# Patient Record
Sex: FEMALE | Race: BLACK OR AFRICAN AMERICAN | URBAN - METROPOLITAN AREA
[De-identification: names, ages, dates, MRNs, and addresses within clinical notes are randomized per-mention and may not be internally consistent; named-entity substitution may affect disease eponyms.]

---

## 2018-08-24 ENCOUNTER — HOSPITAL ENCOUNTER (OUTPATIENT)
Dept: PHYSICAL THERAPY | Age: 28
Discharge: HOME | End: 2018-08-24
Payer: COMMERCIAL

## 2018-08-24 PROCEDURE — 97035 APP MDLTY 1+ULTRASOUND EA 15: CPT

## 2018-08-24 PROCEDURE — 97110 THERAPEUTIC EXERCISES: CPT

## 2018-08-24 PROCEDURE — 97161 PT EVAL LOW COMPLEX 20 MIN: CPT

## 2018-08-24 NOTE — PROGRESS NOTES
Hermelinda Lyon  : 1990 [facility] at [address], [address], [address]  [phone]   [phone]          OUTPATIENT ORTHOPAEDIC PHYSICAL THERAPY    NAME/AGE/GENDER: Hermelinda Lyon is a 33 y.o. female    DATE: 2018                       Ambulatory/Rehab Services H2 Model Falls Risk Assessment    Risk Factor Pts. ·   Confusion/Disorientation/Impulsivity  []    4 ·   Symptomatic Depression  []   2 ·   Altered Elimination  []   1 ·   Dizziness/Vertigo  []   1 ·   Gender (Male)  []   1 ·   Any administered antiepileptics (anticonvulsants):  []   2 ·   Any administered benzodiazepines:  []   1 ·   Visual Impairment (specify):  []   1 ·   Portable Oxygen Use  []   1 ·   Orthostatic ? BP  []   1 ·   History of Recent Falls (within 3 mos.)  [x]   5     Ability to Rise from Chair (choose one) Pts. ·   Ability to rise in a single movement  [x]   0 ·   Pushes up, successful in one attempt  []   1 ·   Multiple attempts, but unsuccessful  []   3 ·   Unable to rise without assistance  []   4   Total: (5 or greater = High Risk) 6     Falls Prevention Plan:   []                Physical Limitations to Exercise (specify):   []                Mobility Assistance Device (type):   []                Exercise/Equipment Adaptation (specify):    © [facility] [address] [place] [address] Patent #[id number].  Federal Law prohibits the replication, distribution or use without written permission from [facility] of [address] [place] [address]

## 2018-08-24 NOTE — THERAPY EVALUATION
Kenzie Farmer  : 1990  Primary: [facility]  Secondary:  [address]  at [place] [id number] [facility], [address] [place] [address],[address], [place] [address].  [phone]   [phone]          OUTPATIENT PHYSICAL THERAPY:Initial Assessment 2018   ICD-10: Treatment Diagnosis:   · Pain in left knee (M25.562)  · Stiffness of left knee, not elsewhere classified (C17.498)  Precautions/Allergies:   Review of patient's allergies indicates no known allergies. Fall Risk Score: 6 (? 5 = High Risk)  MD Orders: Evaluate and Treat MEDICAL/REFERRING DIAGNOSIS:  Other chronic pain [G89.29]  Pain in left knee [M25.562]   DATE OF ONSET: Chronic  REFERRING PHYSICIAN: Dick Syed: JEET     INITIAL ASSESSMENT:  Ms. Laina Cuadra presents with decreased mobility and pain secondary to degenerative changes. After discussing with patient, she agreed she would benefit from physical therapy to improve above deficits. Please sign this plan of treatment if you concur. Thank you for the opportunity to serve this patient. PROBLEM LIST (Impacting functional limitations):  1. Decreased Strength  2. Decreased ADL/Functional Activities  3. Increased Pain  4. Decreased Activity Tolerance INTERVENTIONS PLANNED:  1. Balance Exercise  2. Home Exercise Program (HEP)  3. Neuromuscular Re-education/Strengthening  4. Range of Motion (ROM)  5. Therapeutic Activites  6. Therapeutic Exercise/Strengthening   TREATMENT PLAN:  Effective Dates: 2018 TO 2018 (90 days). Frequency/Duration: 2 times a week for 90 Days  GOALS: (Goals have been discussed and agreed upon with patient.)  Short-Term Functional Goals: Time Frame: 30 days  1. Patient will be independent with home exercise program without exacerbation of symptoms or cueing needed.    2. Patient will be independent with correct left LE positioning and awareness/avoidance of aggravating positions without cueing needed. Discharge Goals: Time Frame: 90 days  1. Patient will be independent with all ADLs with minimal onset of left knee pain and no deficits with daily tasks. 2. Patient will report no fear avoidance with social or recreational activities due to left knee pain. 3. Patient will score greater than or equal to 70/80 on Lower Extremity Functional Scale with minimal effect of left knee pain on patient's ability to manage every day life activities. Rehabilitation Potential For Stated Goals: Good  Regarding [address] [place] [address] therapy, I certify that the treatment plan above will be carried out by a therapist or under their direction. Thank you for this referral,  Norman Barrientos, PT     Referring Physician Signature: Carola Beatty*              Date                    The information in this section was collected on 8/24/2018 (except where otherwise noted). HISTORY:   History of Present Injury/Illness (Reason for Referral):  Patient reports for several months she has been having left knee pain. She reports that it \"pops out and is painful\" until she is able to \"pop it back into place. \"  She reports that she has fallen several times because of the pain. She reports that she works nights now at [employer] and so does more sitting, but she is still having pain. She reports she would like to improve her overall range of motion and decrease her pain with all activities so she doesn't fall again. Past Medical History/Comorbidities:   Ms. Octavio Lewis  has no past medical history of Aneurysm ([address] Arrhythmia; Arthritis; Asthma; Autoimmune disease ([address] CAD (coronary artery disease); Cancer ([address] Chronic kidney disease; Chronic pain; Coagulation defects; COPD; Diabetes ([address] GERD (gastroesophageal reflux disease); Heart failure ([address] Hypertension; Liver disease; Psychiatric disorder; PUD (peptic ulcer disease); Seizures ([address] Stroke [facility]); Thromboembolus ([address] or Thyroid disease.   Ms. Emili Collins  has a past surgical history that includes delivery . Social History/Living Environment:   Home Environment: Private residence  Living Alone: No  Support Systems: Family member(s), Friends \ neighbors  Prior Level of Function/Work/Activity:  Independent  Dominant Side:         RIGHT  Personal Factors:          Sex:  female        Age:  33 y.o. Current Medications:     None   Date Last Reviewed:  2018    Number of Personal Factors/Comorbidities that affect the Plan of Care: 0: LOW COMPLEXITY   EXAMINATION:   Observation/Orthostatic Postural Assessment:          Posture Assessment: Rounded shoulders, Forward head   Palpation:          Tightness and tenderness to palpation noted along lateral portion of left knee. Decreased patellar mobility. No bruising, minimal swelling noted. ROM:          L LE Assessment (AROM):   · Knee Flexion: 120 degrees    · Knee Extension: 0 degrees     Strength:          L LE Assessment (Strength):   · Knee Flexion: 3+/5 with manual muscle testing    · Knee Extension: 3+/5 with manual muscle testing     Special Tests:          Negative anterior drawer test for ACL involvement. Negative (weak) Medial-Lateral Grind test for possible meniscal involvement in left lower extremity. Negative anurag test with rotation for ACL, MCL or capsular involvement. Negative Posterior Drawer test for PCL involvement. Negative posteromedial drawer test for PCL, MCL or capsular involvement. Negative posterolateral drawer test for PCL, LCL or capsular involvement. Neurological Screen:        Dermatomes:   Within normal limits        Reflexes:  2+  Functional Mobility:         Gait/Ambulation:     · Base of Support: Narrowed  · Speed/Charlene: Pace decreased (<100 feet/min)  · Step Length: Left shortened, Right lengthened  · Swing Pattern: Left asymmetrical, Right symmetrical  · Stance: Left decreased, Right increased  · Gait Abnormalities: Antalgic  · Ambulation - Level of Assistance: Independent  · Interventions: Verbal cues, Safety awareness training          Transfers:  Independent        Bed Mobility:  Independent   Body Structures Involved:  1. Joints  2. Muscles Body Functions Affected:  1. Neuromusculoskeletal  2. Movement Related Activities and Participation Affected:  1. Mobility  2. Self Care   Number of elements (examined above) that affect the Plan of Care: 4+: HIGH COMPLEXITY   CLINICAL PRESENTATION:   Presentation: Stable and uncomplicated: LOW COMPLEXITY   CLINICAL DECISION MAKING:   Outcome Measure: Tool Used: Lower Extremity Functional Scale (LEFS)  Score:  Initial: 59/80 Most Recent: X/80 (Date: -- )   Interpretation of Score: 20 questions each scored on a 5 point scale with 0 representing \"extreme difficulty or unable to perform\" and 4 representing \"no difficulty\". The lower the score, the greater the functional disability. 80/80 represents no disability. Minimal detectable change is 9 points. Score 80 79-63 62-48 47-32 31-16 15-1 0   Modifier CH CI CJ CK CL CM CN     Medical Necessity:   · Patient is expected to demonstrate progress in strength and range of motion to increase independence with daily activities. · Patient demonstrates good rehab potential due to higher previous functional level. · Skilled intervention continues to be required due to decreased mobility. Reason for Services/Other Comments:  · Patient continues to demonstrate capacity to improve overall functional mobility which will increase independence and increase safety. Use of outcome tool(s) and clinical judgement create a POC that gives a: Clear prediction of patient's progress: LOW COMPLEXITY            TREATMENT:   (In addition to Assessment/Re-Assessment sessions the following treatments were rendered)  Pre-treatment Symptoms/Complaints:  8/24/2018: Patient reports she isn't hurting right now, but has had pain this morning.    Pain: Initial:   Pain Intensity 1: 5  Pain Location 1: Knee  Pain Orientation 1: Left  Pain Intervention(s) 1: Rest, Medication (see MAR)  Post Session:  5/10     THERAPEUTIC EXERCISE: (10 minutes):  Exercises per grid below to improve mobility, strength, balance and coordination. Required minimal visual, verbal and manual cues to promote proper body alignment, promote proper body posture, promote proper body mechanics and promote proper body breathing techniques. Progressed resistance, range, repetitions and complexity of movement as indicated. Date:  8/24/2018   Activity/Exercise Parameters   Self patellar mobilizations in all directions HEP   Supine straight leg raise: hip flexion HEP   Side-lying hip abduction HEP   Seated LAQ HEP      MODALITIES: (8 minutes): *  Ultrasound was used today secondary to the patient having tightened structures limiting joint motion that require an increase in extensibility and symptomatic soft tissue calcification. Ultrasound was used today to reduce pain, reduce joint stiffness, increase muscle flexibility, increase tendon flexibility and increase ligament flexibility. (Patient in supine - left knee - skin clear after treatment.)   [facility] Portal    Treatment/Session Assessment:    · Response to Treatment:  Patient tolerated assessment without complaints of increased pain. Patient verbalized and demonstrated understanding of HEP. · Compliance with Program/Exercises: Will assess as treatment progresses. · Recommendations/Intent for next treatment session: \"Next visit will focus on advancements to more challenging activities\".   Total Treatment Duration:  PT Patient Time In/Time Out  Time In: 0900  Time Out: CECELIA Butler

## 2018-08-29 ENCOUNTER — HOSPITAL ENCOUNTER (OUTPATIENT)
Dept: PHYSICAL THERAPY | Age: 28
Discharge: HOME | End: 2018-08-29
Payer: COMMERCIAL

## 2018-08-29 PROCEDURE — 97110 THERAPEUTIC EXERCISES: CPT

## 2018-08-29 PROCEDURE — 97035 APP MDLTY 1+ULTRASOUND EA 15: CPT

## 2018-08-31 ENCOUNTER — HOSPITAL ENCOUNTER (OUTPATIENT)
Dept: PHYSICAL THERAPY | Age: 28
Discharge: HOME | End: 2018-08-31
Payer: COMMERCIAL

## 2018-08-31 PROCEDURE — 97110 THERAPEUTIC EXERCISES: CPT

## 2018-08-31 NOTE — PROGRESS NOTES
Cesilia Sequeira  : 1990  Primary: [facility]  Secondary:  [address]  at [facility]  [address] [facility] [address], [address] [facility]  [phone]   [phone]          OUTPATIENT PHYSICAL THERAPY:Daily Note 2018   ICD-10: Treatment Diagnosis:   · Pain in left knee (M25.562)  · Stiffness of left knee, not elsewhere classified (H17.180)  Precautions/Allergies:   Review of patient's allergies indicates no known allergies. Fall Risk Score: 6 (? 5 = High Risk)  MD Orders: Evaluate and Treat MEDICAL/REFERRING DIAGNOSIS:  Other chronic pain [G89.29]  Pain in left knee [M25.562]   DATE OF ONSET: Chronic  REFERRING PHYSICIAN: Emma Penaloza*  RETURN PHYSICIAN APPOINTMENT: TBD     ASSESSMENT:  Ms. Julia Lanier presents with decreased mobility and pain secondary to degenerative changes. Patient has attended a total of 3 scheduled physical therapy visits including initial evaluation on 2018. Treatment has consisted of strengthening, stretching, and modalities to improve pain and performance with activities of daily living. PROBLEM LIST (Impacting functional limitations):  1. Decreased Strength  2. Decreased ADL/Functional Activities  3. Increased Pain  4. Decreased Activity Tolerance INTERVENTIONS PLANNED:  1. Balance Exercise  2. Home Exercise Program (HEP)  3. Neuromuscular Re-education/Strengthening  4. Range of Motion (ROM)  5. Therapeutic Activites  6. Therapeutic Exercise/Strengthening   TREATMENT PLAN:  Effective Dates: 2018 TO 2018 (90 days). Frequency/Duration: 2 times a week for 90 Days  GOALS: (Goals have been discussed and agreed upon with patient.)  Short-Term Functional Goals: Time Frame: 30 days  1. Patient will be independent with home exercise program without exacerbation of symptoms or cueing needed.    2. Patient will be independent with correct left LE positioning and awareness/avoidance of aggravating positions without cueing needed. Discharge Goals: Time Frame: 90 days  1. Patient will be independent with all ADLs with minimal onset of left knee pain and no deficits with daily tasks. 2. Patient will report no fear avoidance with social or recreational activities due to left knee pain. 3. Patient will score greater than or equal to 70/80 on Lower Extremity Functional Scale with minimal effect of left knee pain on patient's ability to manage every day life activities. Rehabilitation Potential For Stated Goals: Good            The information in this section was collected on 2018 (except where otherwise noted). HISTORY:   History of Present Injury/Illness (Reason for Referral):  Patient reports for several months she has been having left knee pain. She reports that it \"pops out and is painful\" until she is able to \"pop it back into place. \"  She reports that she has fallen several times because of the pain. She reports that she works nights now at [employer] and so does more sitting, but she is still having pain. She reports she would like to improve her overall range of motion and decrease her pain with all activities so she doesn't fall again. Past Medical History/Comorbidities:   Ms. Octavio Lewis  has no past medical history of Aneurysm ([address] Arrhythmia; Arthritis; Asthma; Autoimmune disease ([address] CAD (coronary artery disease); Cancer ([address] Chronic kidney disease; Chronic pain; Coagulation defects; COPD; Diabetes ([address] GERD (gastroesophageal reflux disease); Heart failure ([address] Hypertension; Liver disease; Psychiatric disorder; PUD (peptic ulcer disease); Seizures ([facility] Stroke [facility]); Thromboembolus ([facility] or Thyroid disease. Ms. Octavio Lewis  has a past surgical history that includes delivery .   Social History/Living Environment:   Home Environment: Private residence  Living Alone: No  Support Systems: Family member(s), Friends \ neighbors  Prior Level of Function/Work/Activity:  Independent  Dominant Side:         RIGHT  Personal Factors:          Sex:  female        Age:  33 y.o. Current Medications:     None   Date Last Reviewed:  8/31/2018    Number of Personal Factors/Comorbidities that affect the Plan of Care: 0: LOW COMPLEXITY   EXAMINATION:   Observation/Orthostatic Postural Assessment:          Posture Assessment: Rounded shoulders, Forward head   Palpation:          Tightness and tenderness to palpation noted along lateral portion of left knee. Decreased patellar mobility. No bruising, minimal swelling noted. ROM:          L LE Assessment (AROM):   · Knee Flexion: 120 degrees    · Knee Extension: 0 degrees     Strength:          L LE Assessment (Strength):   · Knee Flexion: 3+/5 with manual muscle testing    · Knee Extension: 3+/5 with manual muscle testing     Special Tests:          Negative anterior drawer test for ACL involvement. Negative (weak) Medial-Lateral Grind test for possible meniscal involvement in left lower extremity. Negative anurag test with rotation for ACL, MCL or capsular involvement. Negative Posterior Drawer test for PCL involvement. Negative posteromedial drawer test for PCL, MCL or capsular involvement. Negative posterolateral drawer test for PCL, LCL or capsular involvement. Neurological Screen:        Dermatomes: Within normal limits        Reflexes:  2+  Functional Mobility:         Gait/Ambulation:     · Base of Support: Narrowed  · Speed/Charlene: Pace decreased (<100 feet/min)  · Step Length: Left shortened, Right lengthened  · Swing Pattern: Left asymmetrical, Right symmetrical  · Stance: Left decreased, Right increased  · Gait Abnormalities: Antalgic  · Ambulation - Level of Assistance: Independent  · Interventions: Verbal cues, Safety awareness training          Transfers:  Independent        Bed Mobility:  Independent   Body Structures Involved:  1. Joints  2.  Muscles Body Functions Affected:  1. Neuromusculoskeletal  2. Movement Related Activities and Participation Affected:  1. Mobility  2. Self Care   Number of elements (examined above) that affect the Plan of Care: 4+: HIGH COMPLEXITY   CLINICAL PRESENTATION:   Presentation: Stable and uncomplicated: LOW COMPLEXITY   CLINICAL DECISION MAKING:   Outcome Measure: Tool Used: Lower Extremity Functional Scale (LEFS)  Score:  Initial: 59/80 Most Recent: X/80 (Date: -- )   Interpretation of Score: 20 questions each scored on a 5 point scale with 0 representing \"extreme difficulty or unable to perform\" and 4 representing \"no difficulty\". The lower the score, the greater the functional disability. 80/80 represents no disability. Minimal detectable change is 9 points. Score 80 79-63 62-48 47-32 31-16 15-1 0   Modifier CH CI CJ CK CL CM CN     Medical Necessity:   · Patient is expected to demonstrate progress in strength and range of motion to increase independence with daily activities. · Patient demonstrates good rehab potential due to higher previous functional level. · Skilled intervention continues to be required due to decreased mobility. Reason for Services/Other Comments:  · Patient continues to demonstrate capacity to improve overall functional mobility which will increase independence and increase safety. Use of outcome tool(s) and clinical judgement create a POC that gives a: Clear prediction of patient's progress: LOW COMPLEXITY            TREATMENT:   (In addition to Assessment/Re-Assessment sessions the following treatments were rendered)  Pre-treatment Symptoms/Complaints:  8/31/2018: Patient reports her knee has not popped in/out since her last session.   Pain: Initial:   Pain Intensity 1: 5  Pain Location 1: Knee  Pain Orientation 1: Left  Pain Intervention(s) 1: Rest, Medication (see MAR)  Post Session:  5/10     THERAPEUTIC EXERCISE: (45 minutes):  Exercises per grid below to improve mobility, strength, balance and coordination. Required minimal visual, verbal and manual cues to promote proper body alignment, promote proper body posture, promote proper body mechanics and promote proper body breathing techniques. Progressed resistance, range, repetitions and complexity of movement as indicated. Date:  8/31/2018   Activity/Exercise Parameters   Becky 7 minutes   Tiltboard: right/left, anterior/posterior Static: 20 seconds  Dynamic: 20 reps   Step taps 6 inch  20 reps  B LE   Step ups 6 inch  15 reps  B LE   Calf stretch on slantboard 5 second hold  10 reps   SAQ 15 reps  B LE   Supine straight leg raise: hip flexion 15 reps  L LE   Side-lying hip abduction 15 reps  L LE   Prone hip extension 15 reps  L LE   Kinesiotape L knee - for stability      MODALITIES: (0 minutes): *  Ultrasound was used today secondary to the patient having tightened structures limiting joint motion that require an increase in extensibility and symptomatic soft tissue calcification. Ultrasound was used today to reduce pain, reduce joint stiffness, increase muscle flexibility, increase tendon flexibility and increase ligament flexibility. (Patient in supine - left knee - skin clear after treatment.)   [facility] Portal    Treatment/Session Assessment:    · Response to Treatment:  Patient tolerated treatment without complaints of increased pain. · Compliance with Program/Exercises: Will assess as treatment progresses. · Recommendations/Intent for next treatment session: \"Next visit will focus on advancements to more challenging activities\".   Total Treatment Duration:  PT Patient Time In/Time Out  Time In: 1430  Time Out: [address] [place], CECELIA

## 2018-09-05 ENCOUNTER — HOSPITAL ENCOUNTER (OUTPATIENT)
Dept: PHYSICAL THERAPY | Age: 28
Discharge: HOME | End: 2018-09-05
Payer: COMMERCIAL

## 2018-09-05 NOTE — PROGRESS NOTES
Antonio Pantoja : 1990 Primary: [address] [facility] Secondary:  [facility] Center at [place] [id number] [facility], [address], [address] [facility] [phone]   [phone] OUTPATIENT PHYSICAL THERAPY:Daily Note 2018 ICD-10: Treatment Diagnosis:  
· Pain in left knee (M25.562) · Stiffness of left knee, not elsewhere classified (M25.662) Precautions/Allergies:  
Review of patient's allergies indicates no known allergies. Fall Risk Score: 6 (? 5 = High Risk) MD Orders: Evaluate and Treat MEDICAL/REFERRING DIAGNOSIS: 
Other chronic pain [G89.29] Pain in left knee [M25.562] DATE OF ONSET: Chronic REFERRING PHYSICIAN: Munir MIRANDA PHYSICIAN APPOINTMENT: TBD ASSESSMENT:  Ms. Fredis Carnes presents with decreased mobility and pain secondary to degenerative changes. Patient has attended a total of 4 scheduled physical therapy visits including initial evaluation on 2018. Treatment has consisted of strengthening, stretching, and modalities to improve pain and performance with activities of daily living. PROBLEM LIST (Impacting functional limitations): 1. Decreased Strength 2. Decreased ADL/Functional Activities 3. Increased Pain 4. Decreased Activity Tolerance INTERVENTIONS PLANNED: 
1. Balance Exercise 2. Home Exercise Program (HEP) 3. Neuromuscular Re-education/Strengthening 4. Range of Motion (ROM) 5. Therapeutic Activites 6. Therapeutic Exercise/Strengthening TREATMENT PLAN: 
Effective Dates: 2018 TO 2018 (90 days). Frequency/Duration: 2 times a week for 90 Days GOALS: (Goals have been discussed and agreed upon with patient.) Short-Term Functional Goals: Time Frame: 30 days 1. Patient will be independent with home exercise program without exacerbation of symptoms or cueing needed.   
2. Patient will be independent with correct left LE positioning and awareness/avoidance of aggravating positions without cueing needed. Discharge Goals: Time Frame: 90 days 1. Patient will be independent with all ADLs with minimal onset of left knee pain and no deficits with daily tasks. 2. Patient will report no fear avoidance with social or recreational activities due to left knee pain. 3. Patient will score greater than or equal to 70/80 on Lower Extremity Functional Scale with minimal effect of left knee pain on patient's ability to manage every day life activities. Rehabilitation Potential For Stated Goals: Good The information in this section was collected on 2018 (except where otherwise noted). HISTORY:  
History of Present Injury/Illness (Reason for Referral): 
Patient reports for several months she has been having left knee pain. She reports that it \"pops out and is painful\" until she is able to \"pop it back into place. \"  She reports that she has fallen several times because of the pain. She reports that she works nights now at [employer] and so does more sitting, but she is still having pain. She reports she would like to improve her overall range of motion and decrease her pain with all activities so she doesn't fall again. Past Medical History/Comorbidities: Ms. Hudson Barba  has no past medical history of Aneurysm ([address] Arrhythmia; Arthritis; Asthma; Autoimmune disease ([address] CAD (coronary artery disease); Cancer ([address] Chronic kidney disease; Chronic pain; Coagulation defects; COPD; Diabetes ([address] GERD (gastroesophageal reflux disease); Heart failure ([address] Hypertension; Liver disease; Psychiatric disorder; PUD (peptic ulcer disease); Seizures ([address] Stroke [facility]); Thromboembolus ([address] or Thyroid disease. Ms. Hudson Barba  has a past surgical history that includes delivery . Social History/Living Environment:  
Home Environment: Private residence Living Alone: No 
Support Systems: Family member(s), Friends \ neighbors Prior Level of Function/Work/Activity: 
Independent Dominant Side:  
      RIGHT Personal Factors:   
      Sex:  female Age:  33 y.o. Current Medications:   
 None Date Last Reviewed:  9/5/2018 Number of Personal Factors/Comorbidities that affect the Plan of Care: 0: LOW COMPLEXITY EXAMINATION:  
Observation/Orthostatic Postural Assessment:   
      Posture Assessment: Rounded shoulders, Forward head Palpation:   
      Tightness and tenderness to palpation noted along lateral portion of left knee. Decreased patellar mobility. No bruising, minimal swelling noted. ROM:   
      L LE Assessment (AROM): · Knee Flexion: 120 degrees · Knee Extension: 0 degrees Strength:   
      L LE Assessment (Strength): · Knee Flexion: 3+/5 with manual muscle testing · Knee Extension: 3+/5 with manual muscle testing Special Tests:   
      Negative anterior drawer test for ACL involvement. Negative (weak) Medial-Lateral Grind test for possible meniscal involvement in left lower extremity. Negative anurag test with rotation for ACL, MCL or capsular involvement. Negative Posterior Drawer test for PCL involvement. Negative posteromedial drawer test for PCL, MCL or capsular involvement. Negative posterolateral drawer test for PCL, LCL or capsular involvement. Neurological Screen: 
      Dermatomes: Within normal limits Reflexes:  2+ Functional Mobility:  
      Gait/Ambulation: · Base of Support: Narrowed · Speed/Charlene: Pace decreased (<100 feet/min) · Step Length: Left shortened, Right lengthened · Swing Pattern: Left asymmetrical, Right symmetrical 
· Stance: Left decreased, Right increased · Gait Abnormalities: Antalgic · Ambulation - Level of Assistance: Independent · Interventions: Verbal cues, Safety awareness training Transfers:  Independent Bed Mobility:  Independent Body Structures Involved: 1. Joints 2. Muscles Body Functions Affected: 1. Neuromusculoskeletal 
2. Movement Related Activities and Participation Affected: 1. Mobility 2. Self Care Number of elements (examined above) that affect the Plan of Care: 4+: HIGH COMPLEXITY CLINICAL PRESENTATION:  
Presentation: Stable and uncomplicated: LOW COMPLEXITY CLINICAL DECISION MAKING:  
Outcome Measure: Tool Used: Lower Extremity Functional Scale (LEFS) Score:  Initial: 59/80 Most Recent: X/80 (Date: -- ) Interpretation of Score: 20 questions each scored on a 5 point scale with 0 representing \"extreme difficulty or unable to perform\" and 4 representing \"no difficulty\". The lower the score, the greater the functional disability. 80/80 represents no disability. Minimal detectable change is 9 points. Score 80 79-63 62-48 47-32 31-16 15-1 0 Modifier CH CI CJ CK CL CM CN Medical Necessity:  
· Patient is expected to demonstrate progress in strength and range of motion to increase independence with daily activities. · Patient demonstrates good rehab potential due to higher previous functional level. · Skilled intervention continues to be required due to decreased mobility. Reason for Services/Other Comments: 
· Patient continues to demonstrate capacity to improve overall functional mobility which will increase independence and increase safety. Use of outcome tool(s) and clinical judgement create a POC that gives a: Clear prediction of patient's progress: LOW COMPLEXITY  
  
 
 
 
TREATMENT:  
(In addition to Assessment/Re-Assessment sessions the following treatments were rendered) Pre-treatment Symptoms/Complaints:  9/5/2018: Patient reports her knee has not popped in/out since her last session. Pain: Initial:  
   Post Session:  5/10 THERAPEUTIC EXERCISE: (45 minutes):  Exercises per grid below to improve mobility, strength, balance and coordination.   Required minimal visual, verbal and manual cues to promote proper body alignment, promote proper body posture, promote proper body mechanics and promote proper body breathing techniques. Progressed resistance, range, repetitions and complexity of movement as indicated. Date: 
9-5-18 Activity/Exercise Parameters Airdyne 7 minutes Tiltboard: right/left, anterior/posterior Static: 20 seconds Dynamic: 20 reps Step taps 6 inch 20 reps B LE  
Step ups 6 inch 15 reps B LE  
Calf stretch on slantboard 5 second hold 10 reps SAQ-LAQ 15 reps B LE  
Supine straight leg raise: hip flexion 15 reps L LE Side-lying hip abduction 15 reps L LE  
Prone hip extension Kinesiotape of knee MODALITIES: (0 minutes): *  Ultrasound was used today secondary to the patient having tightened structures limiting joint motion that require an increase in extensibility and symptomatic soft tissue calcification. Ultrasound was used today to reduce pain, reduce joint stiffness, increase muscle flexibility, increase tendon flexibility and increase ligament flexibility. (Patient in supine - left knee - skin clear after treatment.) [facility] 
 
Treatment/Session Assessment:   
· Response to Treatment:  Patient tolerated treatment without complaints of increased pain. · Compliance with Program/Exercises: Will assess as treatment progresses. · Recommendations/Intent for next treatment session: \"Next visit will focus on advancements to more challenging activities\".  
Total Treatment Duration: 
  
 
Zayda Plaza PTA

## 2018-09-07 ENCOUNTER — HOSPITAL ENCOUNTER (OUTPATIENT)
Dept: PHYSICAL THERAPY | Age: 28
Discharge: HOME | End: 2018-09-07
Payer: COMMERCIAL

## 2018-09-07 PROCEDURE — 97110 THERAPEUTIC EXERCISES: CPT

## 2018-09-07 NOTE — PROGRESS NOTES
Joel Mills  : 1990  Primary: [facility]  Secondary:  [address] at [place] [address] [facility], [address] [place] [address],[address], [address] [employer] [facility]  [phone]   [phone]          OUTPATIENT PHYSICAL THERAPY:Daily Note 2018   ICD-10: Treatment Diagnosis:   · Pain in left knee (M25.562)  · Stiffness of left knee, not elsewhere classified (T47.779)  Precautions/Allergies:   Review of patient's allergies indicates no known allergies. Fall Risk Score: 6 (? 5 = High Risk)  MD Orders: Evaluate and Treat MEDICAL/REFERRING DIAGNOSIS:  Other chronic pain [G89.29]  Pain in left knee [M25.562]   DATE OF ONSET: Chronic  REFERRING PHYSICIAN: Anthony Miller*  RETURN PHYSICIAN APPOINTMENT: TBD     ASSESSMENT:  Ms. Leighton Howard presents with decreased mobility and pain secondary to degenerative changes. Patient has attended a total of 4 scheduled physical therapy visits including initial evaluation on 2018. Treatment has consisted of strengthening, stretching, and modalities to improve pain and performance with activities of daily living. PROBLEM LIST (Impacting functional limitations):  1. Decreased Strength  2. Decreased ADL/Functional Activities  3. Increased Pain  4. Decreased Activity Tolerance INTERVENTIONS PLANNED:  1. Balance Exercise  2. Home Exercise Program (HEP)  3. Neuromuscular Re-education/Strengthening  4. Range of Motion (ROM)  5. Therapeutic Activites  6. Therapeutic Exercise/Strengthening   TREATMENT PLAN:  Effective Dates: 2018 TO 2018 (90 days). Frequency/Duration: 2 times a week for 90 Days  GOALS: (Goals have been discussed and agreed upon with patient.)  Short-Term Functional Goals: Time Frame: 30 days  1. Patient will be independent with home exercise program without exacerbation of symptoms or cueing needed.    2. Patient will be independent with correct left LE positioning and awareness/avoidance of aggravating positions without cueing needed. Discharge Goals: Time Frame: 90 days  1. Patient will be independent with all ADLs with minimal onset of left knee pain and no deficits with daily tasks. 2. Patient will report no fear avoidance with social or recreational activities due to left knee pain. 3. Patient will score greater than or equal to 70/80 on Lower Extremity Functional Scale with minimal effect of left knee pain on patient's ability to manage every day life activities. Rehabilitation Potential For Stated Goals: Good            The information in this section was collected on 2018 (except where otherwise noted). HISTORY:   History of Present Injury/Illness (Reason for Referral):  Patient reports for several months she has been having left knee pain. She reports that it \"pops out and is painful\" until she is able to \"pop it back into place. \"  She reports that she has fallen several times because of the pain. She reports that she works nights now at [address] [place] [address] [employer] and so does more sitting, but she is still having pain. She reports she would like to improve her overall range of motion and decrease her pain with all activities so she doesn't fall again. Past Medical History/Comorbidities:   Ms. Niru Gordon  has no past medical history of Aneurysm ([address] Arrhythmia; Arthritis; Asthma; Autoimmune disease ([address] CAD (coronary artery disease); Cancer ([address] Chronic kidney disease; Chronic pain; Coagulation defects; COPD; Diabetes ([address] GERD (gastroesophageal reflux disease); Heart failure ([address] Hypertension; Liver disease; Psychiatric disorder; PUD (peptic ulcer disease); Seizures ([facility] Stroke [facility]); Thromboembolus ([facility] [address] or Thyroid disease. Ms. Niru Gordon  has a past surgical history that includes delivery .   Social History/Living Environment:   Home Environment: Private residence  Living Alone: No  Support Systems: Family member(s), Friends \ neighbors  Prior Level of Function/Work/Activity:  Independent  Dominant Side:         RIGHT  Personal Factors:          Sex:  female        Age:  33 y.o. Current Medications:     None   Date Last Reviewed:  9/7/2018    Number of Personal Factors/Comorbidities that affect the Plan of Care: 0: LOW COMPLEXITY   EXAMINATION:   Observation/Orthostatic Postural Assessment:          Posture Assessment: Rounded shoulders, Forward head   Palpation:          Tightness and tenderness to palpation noted along lateral portion of left knee. Decreased patellar mobility. No bruising, minimal swelling noted. ROM:          L LE Assessment (AROM):   · Knee Flexion: 120 degrees    · Knee Extension: 0 degrees     Strength:          L LE Assessment (Strength):   · Knee Flexion: 3+/5 with manual muscle testing    · Knee Extension: 3+/5 with manual muscle testing     Special Tests:          Negative anterior drawer test for ACL involvement. Negative (weak) Medial-Lateral Grind test for possible meniscal involvement in left lower extremity. Negative anurag test with rotation for ACL, MCL or capsular involvement. Negative Posterior Drawer test for PCL involvement. Negative posteromedial drawer test for PCL, MCL or capsular involvement. Negative posterolateral drawer test for PCL, LCL or capsular involvement. Neurological Screen:        Dermatomes: Within normal limits        Reflexes:  2+  Functional Mobility:         Gait/Ambulation:     · Base of Support: Narrowed  · Speed/Charlene: Pace decreased (<100 feet/min)  · Step Length: Left shortened, Right lengthened  · Swing Pattern: Left asymmetrical, Right symmetrical  · Stance: Left decreased, Right increased  · Gait Abnormalities: Antalgic  · Ambulation - Level of Assistance: Independent  · Interventions: Verbal cues, Safety awareness training          Transfers:  Independent        Bed Mobility:  Independent   Body Structures Involved:  1. Joints  2.  Muscles Body Functions Affected:  1. Neuromusculoskeletal  2. Movement Related Activities and Participation Affected:  1. Mobility  2. Self Care   Number of elements (examined above) that affect the Plan of Care: 4+: HIGH COMPLEXITY   CLINICAL PRESENTATION:   Presentation: Stable and uncomplicated: LOW COMPLEXITY   CLINICAL DECISION MAKING:   Outcome Measure: Tool Used: Lower Extremity Functional Scale (LEFS)  Score:  Initial: 59/80 Most Recent: X/80 (Date: -- )   Interpretation of Score: 20 questions each scored on a 5 point scale with 0 representing \"extreme difficulty or unable to perform\" and 4 representing \"no difficulty\". The lower the score, the greater the functional disability. 80/80 represents no disability. Minimal detectable change is 9 points. Score 80 79-63 62-48 47-32 31-16 15-1 0   Modifier CH CI CJ CK CL CM CN     Medical Necessity:   · Patient is expected to demonstrate progress in strength and range of motion to increase independence with daily activities. · Patient demonstrates good rehab potential due to higher previous functional level. · Skilled intervention continues to be required due to decreased mobility. Reason for Services/Other Comments:  · Patient continues to demonstrate capacity to improve overall functional mobility which will increase independence and increase safety. Use of outcome tool(s) and clinical judgement create a POC that gives a: Clear prediction of patient's progress: LOW COMPLEXITY            TREATMENT:   (In addition to Assessment/Re-Assessment sessions the following treatments were rendered)  Pre-treatment Symptoms/Complaints:  9/7/2018: Patient reports her knee is still popping out.   She reports she is going to go and get an x-ray from the MD.  Pain: Initial:   Pain Intensity 1: 5  Pain Location 1: Knee  Pain Orientation 1: Left  Pain Intervention(s) 1: Rest, Medication (see MAR)  Post Session:  5/10     THERAPEUTIC EXERCISE: (45 minutes):  Exercises per grid below to improve mobility, strength, balance and coordination. Required minimal visual, verbal and manual cues to promote proper body alignment, promote proper body posture, promote proper body mechanics and promote proper body breathing techniques. Progressed resistance, range, repetitions and complexity of movement as indicated. Date:  9/7/2018   Activity/Exercise Parameters   Becky 7 minutes   Tiltboard: right/left, anterior/posterior Static: 20 seconds  Dynamic: 20 reps   Step taps 6 inch  20 reps  B LE   Step ups 6 inch  15 reps  B LE   Calf stretch on slantboard 5 second hold  10 reps   SAQ 15 reps  B LE   Supine straight leg raise: hip flexion 15 reps  L LE   Side-lying hip abduction 15 reps  L LE   Prone hip extension 15 reps  L LE   Kinesiotape L knee - for stability      MODALITIES: (0 minutes): *  Ultrasound was used today secondary to the patient having tightened structures limiting joint motion that require an increase in extensibility and symptomatic soft tissue calcification. Ultrasound was used today to reduce pain, reduce joint stiffness, increase muscle flexibility, increase tendon flexibility and increase ligament flexibility. (Patient in supine - left knee - skin clear after treatment.)   [facility] Portal    Treatment/Session Assessment:    · Response to Treatment:  Patient tolerated treatment without complaints of increased pain. · Compliance with Program/Exercises: Will assess as treatment progresses. · Recommendations/Intent for next treatment session: \"Next visit will focus on advancements to more challenging activities\".   Total Treatment Duration:  PT Patient Time In/Time Out  Time In: 0900  Time Out: CECELIA Butler

## 2018-09-12 ENCOUNTER — HOSPITAL ENCOUNTER (OUTPATIENT)
Dept: PHYSICAL THERAPY | Age: 28
Discharge: HOME | End: 2018-09-12
Payer: COMMERCIAL

## 2018-09-12 PROCEDURE — 97110 THERAPEUTIC EXERCISES: CPT

## 2018-09-12 NOTE — PROGRESS NOTES
Maylin Lebron  : 1990  Primary: formerly [facility]  Secondary:  [address] at [address], [address] [place] [address],[address], [place] [address].  [phone]   [phone]          OUTPATIENT PHYSICAL THERAPY:Daily Note 2018   ICD-10: Treatment Diagnosis:   · Pain in left knee (M25.562)  · Stiffness of left knee, not elsewhere classified (J48.268)  Precautions/Allergies:   Review of patient's allergies indicates no known allergies. Fall Risk Score: 6 (? 5 = High Risk)  MD Orders: Evaluate and Treat MEDICAL/REFERRING DIAGNOSIS:  Other chronic pain [G89.29]  Pain in left knee [M25.562]   DATE OF ONSET: Chronic  REFERRING PHYSICIAN: Macy Cote*  RETURN PHYSICIAN APPOINTMENT: TBD     ASSESSMENT:  Ms. Brandon Gonzalez presents with decreased mobility and pain secondary to degenerative changes. Patient has attended a total of 5 scheduled physical therapy visits including initial evaluation on 2018. Treatment has consisted of strengthening, stretching, and modalities to improve pain and performance with activities of daily living. PROBLEM LIST (Impacting functional limitations):  1. Decreased Strength  2. Decreased ADL/Functional Activities  3. Increased Pain  4. Decreased Activity Tolerance INTERVENTIONS PLANNED:  1. Balance Exercise  2. Home Exercise Program (HEP)  3. Neuromuscular Re-education/Strengthening  4. Range of Motion (ROM)  5. Therapeutic Activites  6. Therapeutic Exercise/Strengthening   TREATMENT PLAN:  Effective Dates: 2018 TO 2018 (90 days). Frequency/Duration: 2 times a week for 90 Days  GOALS: (Goals have been discussed and agreed upon with patient.)  Short-Term Functional Goals: Time Frame: 30 days  1. Patient will be independent with home exercise program without exacerbation of symptoms or cueing needed.    2. Patient will be independent with correct left LE positioning and awareness/avoidance of aggravating positions without cueing needed. Discharge Goals: Time Frame: 90 days  1. Patient will be independent with all ADLs with minimal onset of left knee pain and no deficits with daily tasks. 2. Patient will report no fear avoidance with social or recreational activities due to left knee pain. 3. Patient will score greater than or equal to 70/80 on Lower Extremity Functional Scale with minimal effect of left knee pain on patient's ability to manage every day life activities. Rehabilitation Potential For Stated Goals: Good            The information in this section was collected on 2018 (except where otherwise noted). HISTORY:   History of Present Injury/Illness (Reason for Referral):  Patient reports for several months she has been having left knee pain. She reports that it \"pops out and is painful\" until she is able to \"pop it back into place. \"  She reports that she has fallen several times because of the pain. She reports that she works nights now at [employer] and so does more sitting, but she is still having pain. She reports she would like to improve her overall range of motion and decrease her pain with all activities so she doesn't fall again. Past Medical History/Comorbidities:   Ms. Emili Collins  has no past medical history of Aneurysm ([address] Arrhythmia; Arthritis; Asthma; Autoimmune disease ([address] CAD (coronary artery disease); Cancer ([address] Chronic kidney disease; Chronic pain; Coagulation defects; COPD; Diabetes ([address] GERD (gastroesophageal reflux disease); Heart failure ([address] Hypertension; Liver disease; Psychiatric disorder; PUD (peptic ulcer disease); Seizures ([address] Stroke [facility]); Thromboembolus ([address] or Thyroid disease. Ms. Emili Collins  has a past surgical history that includes delivery .   Social History/Living Environment:   Home Environment: Private residence  Living Alone: No  Support Systems: Family member(s), Friends \ neighbors  Prior Level of Function/Work/Activity:  Independent  Dominant Side:         RIGHT  Personal Factors:          Sex:  female        Age:  33 y.o. Current Medications:     None   Date Last Reviewed:  9/12/2018    Number of Personal Factors/Comorbidities that affect the Plan of Care: 0: LOW COMPLEXITY   EXAMINATION:   Observation/Orthostatic Postural Assessment:          Posture Assessment: Rounded shoulders, Forward head   Palpation:          Tightness and tenderness to palpation noted along lateral portion of left knee. Decreased patellar mobility. No bruising, minimal swelling noted. ROM:          L LE Assessment (AROM):   · Knee Flexion: 120 degrees    · Knee Extension: 0 degrees     Strength:          L LE Assessment (Strength):   · Knee Flexion: 3+/5 with manual muscle testing    · Knee Extension: 3+/5 with manual muscle testing     Special Tests:          Negative anterior drawer test for ACL involvement. Negative (weak) Medial-Lateral Grind test for possible meniscal involvement in left lower extremity. Negative anurag test with rotation for ACL, MCL or capsular involvement. Negative Posterior Drawer test for PCL involvement. Negative posteromedial drawer test for PCL, MCL or capsular involvement. Negative posterolateral drawer test for PCL, LCL or capsular involvement. Neurological Screen:        Dermatomes: Within normal limits        Reflexes:  2+  Functional Mobility:         Gait/Ambulation:     · Base of Support: Narrowed  · Speed/Charlene: Pace decreased (<100 feet/min)  · Step Length: Left shortened, Right lengthened  · Swing Pattern: Left asymmetrical, Right symmetrical  · Stance: Left decreased, Right increased  · Gait Abnormalities: Antalgic  · Ambulation - Level of Assistance: Independent  · Interventions: Verbal cues, Safety awareness training          Transfers:  Independent        Bed Mobility:  Independent   Body Structures Involved:  1. Joints  2.  Muscles Body Functions Affected:  1. Neuromusculoskeletal  2. Movement Related Activities and Participation Affected:  1. Mobility  2. Self Care   Number of elements (examined above) that affect the Plan of Care: 4+: HIGH COMPLEXITY   CLINICAL PRESENTATION:   Presentation: Stable and uncomplicated: LOW COMPLEXITY   CLINICAL DECISION MAKING:   Outcome Measure: Tool Used: Lower Extremity Functional Scale (LEFS)  Score:  Initial: 59/80 Most Recent: X/80 (Date: -- )   Interpretation of Score: 20 questions each scored on a 5 point scale with 0 representing \"extreme difficulty or unable to perform\" and 4 representing \"no difficulty\". The lower the score, the greater the functional disability. 80/80 represents no disability. Minimal detectable change is 9 points. Score 80 79-63 62-48 47-32 31-16 15-1 0   Modifier CH CI CJ CK CL CM CN     Medical Necessity:   · Patient is expected to demonstrate progress in strength and range of motion to increase independence with daily activities. · Patient demonstrates good rehab potential due to higher previous functional level. · Skilled intervention continues to be required due to decreased mobility. Reason for Services/Other Comments:  · Patient continues to demonstrate capacity to improve overall functional mobility which will increase independence and increase safety. Use of outcome tool(s) and clinical judgement create a POC that gives a: Clear prediction of patient's progress: LOW COMPLEXITY            TREATMENT:   (In addition to Assessment/Re-Assessment sessions the following treatments were rendered)  Pre-treatment Symptoms/Complaints:  9/12/2018: 'I go get the xray this morning, will need to leave early from this appointment if that is ok:. Pain: Initial:     3-10 Post Session:  310     THERAPEUTIC EXERCISE: (30 minutes):  Exercises per grid below to improve mobility, strength, balance and coordination.   Required minimal visual, verbal and manual cues to promote proper body alignment, promote proper body posture, promote proper body mechanics and promote proper body breathing techniques. Progressed resistance, range, repetitions and complexity of movement as indicated. Date:  9/12/2018   Activity/Exercise Parameters   Airdyne 7 minutes   Tiltboard: right/left, anterior/posterior Static: 20 seconds  Dynamic: 20 reps   Step taps 6 inch  20 reps  B LE   Step ups 6 inch  15 reps  B LE   Calf stretch on slantboard 5 second hold  10 reps   SAQ-  lAQ 15 reps  B LE   Supine straight leg raise: hip flexion 15 reps  L LE   Side-lying hip abduction 15 reps  L LE   Nautilus Leg Press    Prone knee flexion        Prone hip extension 15 reps  L LE   Kinesiotape L knee - for stability      MODALITIES: (0 minutes): *  Ultrasound was used today secondary to the patient having tightened structures limiting joint motion that require an increase in extensibility and symptomatic soft tissue calcification. Ultrasound was used today to reduce pain, reduce joint stiffness, increase muscle flexibility, increase tendon flexibility and increase ligament flexibility. (Patient in supine - left knee - skin clear after treatment.)   [facility] Portal    Treatment/Session Assessment:    · Response to Treatment:  Will wait for results fo xray as she gets one this morning at MD's office. Patient had to leave early for xray of knee, visit cut short. She continue's to feel like knee gives out at times at work. · Compliance with Program/Exercises: Will assess as treatment progresses. · Recommendations/Intent for next treatment session: \"Next visit will focus on advancements to more challenging activities\".   Total Treatment Duration:  PT Patient Time In/Time Out  Time In: 0730  Time Out: 0815    Rachna Silva PTA

## 2018-09-14 ENCOUNTER — HOSPITAL ENCOUNTER (OUTPATIENT)
Dept: PHYSICAL THERAPY | Age: 28
Discharge: HOME | End: 2018-09-14
Payer: COMMERCIAL

## 2018-09-19 ENCOUNTER — HOSPITAL ENCOUNTER (OUTPATIENT)
Dept: PHYSICAL THERAPY | Age: 28
Discharge: HOME | End: 2018-09-19
Payer: COMMERCIAL

## 2018-12-05 NOTE — THERAPY DISCHARGE
Radha Zara  : 1990  Primary: [facility]  Secondary:  [address] at [place] [address] [facility], [address] [place] [address],[address], [facility] [address].  [phone]   [phone]          OUTPATIENT PHYSICAL THERAPY:Daily Note and Discharge 2018   ICD-10: Treatment Diagnosis:   · Pain in left knee (M25.562)  · Stiffness of left knee, not elsewhere classified (D41.255)  Precautions/Allergies:   Patient has no known allergies. Fall Risk Score: 6 (? 5 = High Risk)  MD Orders: Evaluate and Treat MEDICAL/REFERRING DIAGNOSIS:  Other chronic pain [G89.29]  Pain in left knee [M25.562]   DATE OF ONSET: Chronic  REFERRING PHYSICIAN: Carola Beatty*  RETURN PHYSICIAN APPOINTMENT: TBD     ASSESSMENT:  Ms. Octavio Lewis presents with decreased mobility and pain secondary to degenerative changes. Patient has attended a total of 5 scheduled physical therapy visits including initial evaluation on 2018. Treatment has consisted of strengthening, stretching, and modalities to improve pain and performance with activities of daily living. PROBLEM LIST (Impacting functional limitations):  1. Decreased Strength  2. Decreased ADL/Functional Activities  3. Increased Pain  4. Decreased Activity Tolerance INTERVENTIONS PLANNED:  1. Balance Exercise  2. Home Exercise Program (HEP)  3. Neuromuscular Re-education/Strengthening  4. Range of Motion (ROM)  5. Therapeutic Activites  6. Therapeutic Exercise/Strengthening   TREATMENT PLAN:  Effective Dates: 2018 TO 2018 (90 days). Frequency/Duration: 2 times a week for 90 Days  GOALS: (Goals have been discussed and agreed upon with patient.)  Short-Term Functional Goals: Time Frame: 30 days  1. Patient will be independent with home exercise program without exacerbation of symptoms or cueing needed.    2. Patient will be independent with correct left LE positioning and awareness/avoidance of aggravating positions without cueing needed. Discharge Goals: Time Frame: 90 days  1. Patient will be independent with all ADLs with minimal onset of left knee pain and no deficits with daily tasks. 2. Patient will report no fear avoidance with social or recreational activities due to left knee pain. 3. Patient will score greater than or equal to 70/80 on Lower Extremity Functional Scale with minimal effect of left knee pain on patient's ability to manage every day life activities. Rehabilitation Potential For Stated Goals: Good            The information in this section was collected on 2018 (except where otherwise noted). HISTORY:   History of Present Injury/Illness (Reason for Referral):  Patient reports for several months she has been having left knee pain. She reports that it \"pops out and is painful\" until she is able to \"pop it back into place. \"  She reports that she has fallen several times because of the pain. She reports that she works nights now at [employer] and so does more sitting, but she is still having pain. She reports she would like to improve her overall range of motion and decrease her pain with all activities so she doesn't fall again. Past Medical History/Comorbidities:   Ms. Jesica Oscar  has no past medical history on file. Ms. Jesica Oscar  has a past surgical history that includes delivery  and BREAST REDUCTION (Bilateral, 2010). Social History/Living Environment:   Home Environment: Private residence  Living Alone: No  Support Systems: Family member(s), Friends \ neighbors  Prior Level of Function/Work/Activity:  Independent  Dominant Side:         RIGHT  Personal Factors:          Sex:  female        Age:  33 y.o.   Current Medications:     None   Date Last Reviewed:  2018    Number of Personal Factors/Comorbidities that affect the Plan of Care: 0: LOW COMPLEXITY   EXAMINATION:   Observation/Orthostatic Postural Assessment:          Posture Assessment: Rounded shoulders, Forward head Palpation:          Tightness and tenderness to palpation noted along lateral portion of left knee. Decreased patellar mobility. No bruising, minimal swelling noted. ROM:          L LE Assessment (AROM):   · Knee Flexion: 120 degrees    · Knee Extension: 0 degrees     Strength:          L LE Assessment (Strength):   · Knee Flexion: 3+/5 with manual muscle testing    · Knee Extension: 3+/5 with manual muscle testing     Special Tests:          Negative anterior drawer test for ACL involvement. Negative (weak) Medial-Lateral Grind test for possible meniscal involvement in left lower extremity. Negative anurag test with rotation for ACL, MCL or capsular involvement. Negative Posterior Drawer test for PCL involvement. Negative posteromedial drawer test for PCL, MCL or capsular involvement. Negative posterolateral drawer test for PCL, LCL or capsular involvement. Neurological Screen:        Dermatomes: Within normal limits        Reflexes:  2+  Functional Mobility:         Gait/Ambulation:     · Base of Support: Narrowed  · Speed/Charlene: Pace decreased (<100 feet/min)  · Step Length: Left shortened, Right lengthened  · Swing Pattern: Left asymmetrical, Right symmetrical  · Stance: Left decreased, Right increased  · Gait Abnormalities: Antalgic  · Ambulation - Level of Assistance: Independent  · Interventions: Verbal cues, Safety awareness training          Transfers:  Independent        Bed Mobility:  Independent   Body Structures Involved:  1. Joints  2. Muscles Body Functions Affected:  1. Neuromusculoskeletal  2. Movement Related Activities and Participation Affected:  1. Mobility  2. Self Care   Number of elements (examined above) that affect the Plan of Care: 4+: HIGH COMPLEXITY   CLINICAL PRESENTATION:   Presentation: Stable and uncomplicated: LOW COMPLEXITY   CLINICAL DECISION MAKING:   Outcome Measure:    Tool Used: Lower Extremity Functional Scale (LEFS)  Score:  Initial: 59/80 Most Recent: X/80 (Date: -- )   Interpretation of Score: 20 questions each scored on a 5 point scale with 0 representing \"extreme difficulty or unable to perform\" and 4 representing \"no difficulty\". The lower the score, the greater the functional disability. 80/80 represents no disability. Minimal detectable change is 9 points. Score 80 79-63 62-48 47-32 31-16 15-1 0   Modifier CH CI CJ CK CL CM CN     Medical Necessity:   · Patient is expected to demonstrate progress in strength and range of motion to increase independence with daily activities. · Patient demonstrates good rehab potential due to higher previous functional level. · Skilled intervention continues to be required due to decreased mobility. Reason for Services/Other Comments:  · Patient continues to demonstrate capacity to improve overall functional mobility which will increase independence and increase safety. Use of outcome tool(s) and clinical judgement create a POC that gives a: Clear prediction of patient's progress: LOW COMPLEXITY            TREATMENT:   (In addition to Assessment/Re-Assessment sessions the following treatments were rendered)  Pre-treatment Symptoms/Complaints:  12/5/2018: 'I go get the xray this morning, will need to leave early from this appointment if that is ok:. Pain: Initial:     3-10 Post Session:  310     THERAPEUTIC EXERCISE: (30 minutes):  Exercises per grid below to improve mobility, strength, balance and coordination. Required minimal visual, verbal and manual cues to promote proper body alignment, promote proper body posture, promote proper body mechanics and promote proper body breathing techniques. Progressed resistance, range, repetitions and complexity of movement as indicated.    Date:  9/12/2018   Activity/Exercise Parameters   Airdyne 7 minutes   Tiltboard: right/left, anterior/posterior Static: 20 seconds  Dynamic: 20 reps   Step taps 6 inch  20 reps  B LE   Step ups 6 inch  15 reps  B LE   Calf stretch on slantboard 5 second hold  10 reps   SAQ-  lAQ 15 reps  B LE   Supine straight leg raise: hip flexion 15 reps  L LE   Side-lying hip abduction 15 reps  L LE   Nautilus Leg Press    Prone knee flexion        Prone hip extension 15 reps  L LE   Kinesiotape L knee - for stability      MODALITIES: (0 minutes): *  Ultrasound was used today secondary to the patient having tightened structures limiting joint motion that require an increase in extensibility and symptomatic soft tissue calcification. Ultrasound was used today to reduce pain, reduce joint stiffness, increase muscle flexibility, increase tendon flexibility and increase ligament flexibility. (Patient in supine - left knee - skin clear after treatment.)   [facility] Portal    Treatment/Session Assessment:    · Response to Treatment:  Patient has been seen for 5 Physical Therapy visits starting 8-24-18 and has missed 5 visits. Patient was inconsistent with therapy appointments and never returned to PT after her 9-12-18 visit. Discharge at this time. · Compliance with Program/Exercises:NON COMPLIANT. · Recommendations/Intent for next treatment session: Discharge at this time.   Total Treatment Duration:  PT Patient Time In/Time Out  Time In: 0730  Time Out: 0815    Pravin Tyson, REHANA

## 2019-09-26 NOTE — PROGRESS NOTES
Therapy Center at [address], [address] [place] [address],[address] [place], [address]  Phone: [phone]   Fax: [phone]    OUTPATIENT DAILY NOTE    NAME/AGE/GENDER: Camila Mccarty is a 33 y.o. female. DATE: 9/14/2018    Patient cancelled her appointment for today due to kids getting out of school early. Will plan to follow up on next scheduled visit.     Clark Frances, PT
You can access the FollowMyHealth Patient Portal offered by [facility] by registering at the following website: [email or web address]. By joining [employer]’s FollowMyHealth portal, you will also be able to view your health information using other applications (apps) compatible with our system.

## 2021-05-11 ENCOUNTER — HOSPITAL ENCOUNTER (EMERGENCY)
Age: 31
Discharge: HOME | End: 2021-05-11
Attending: STUDENT IN AN ORGANIZED HEALTH CARE EDUCATION/TRAINING PROGRAM

## 2021-05-11 ENCOUNTER — APPOINTMENT (OUTPATIENT)
Dept: CT IMAGING | Age: 31
End: 2021-05-11
Attending: STUDENT IN AN ORGANIZED HEALTH CARE EDUCATION/TRAINING PROGRAM

## 2021-05-11 VITALS
DIASTOLIC BLOOD PRESSURE: 69 MMHG | WEIGHT: 214 LBS | TEMPERATURE: 97.7 F | HEART RATE: 70 BPM | RESPIRATION RATE: 20 BRPM | HEIGHT: 63 IN | BODY MASS INDEX: 37.92 KG/M2 | SYSTOLIC BLOOD PRESSURE: 143 MMHG | OXYGEN SATURATION: 99 %

## 2021-05-11 DIAGNOSIS — G43.809 OTHER MIGRAINE WITHOUT STATUS MIGRAINOSUS, NOT INTRACTABLE: Primary | ICD-10-CM

## 2021-05-11 LAB
ALBUMIN SERPL-MCNC: 4.1 G/DL (ref 3.5–5)
ALBUMIN/GLOB SERPL: 1 {RATIO} (ref 1.2–3.5)
ALP SERPL-CCNC: 98 U/L (ref 50–136)
ALT SERPL-CCNC: 21 U/L (ref 12–65)
ANION GAP SERPL CALC-SCNC: 5 MMOL/L (ref 7–16)
AST SERPL-CCNC: 20 U/L (ref 15–37)
BASOPHILS # BLD: 0 K/UL (ref 0–0.2)
BASOPHILS NFR BLD: 0 % (ref 0–2)
BILIRUB SERPL-MCNC: 0.5 MG/DL (ref 0.2–1.1)
BUN SERPL-MCNC: 9 MG/DL (ref 6–23)
CALCIUM SERPL-MCNC: 9 MG/DL (ref 8.3–10.4)
CHLORIDE SERPL-SCNC: 110 MMOL/L (ref 98–107)
CO2 SERPL-SCNC: 25 MMOL/L (ref 21–32)
CREAT SERPL-MCNC: 0.9 MG/DL (ref 0.6–1)
DIFFERENTIAL METHOD BLD: NORMAL
EOSINOPHIL # BLD: 0.2 K/UL (ref 0–0.8)
EOSINOPHIL NFR BLD: 2 % (ref 0.5–7.8)
ERYTHROCYTE [DISTWIDTH] IN BLOOD BY AUTOMATED COUNT: 13.4 % (ref 11.9–14.6)
GLOBULIN SER CALC-MCNC: 4.1 G/DL (ref 2.3–3.5)
GLUCOSE SERPL-MCNC: 80 MG/DL (ref 65–100)
HCT VFR BLD AUTO: 41 % (ref 35.8–46.3)
HGB BLD-MCNC: 13.8 G/DL (ref 11.7–15.4)
IMM GRANULOCYTES # BLD AUTO: 0 K/UL (ref 0–0.5)
IMM GRANULOCYTES NFR BLD AUTO: 0 % (ref 0–5)
LYMPHOCYTES # BLD: 2.4 K/UL (ref 0.5–4.6)
LYMPHOCYTES NFR BLD: 26 % (ref 13–44)
MCH RBC QN AUTO: 29.1 PG (ref 26.1–32.9)
MCHC RBC AUTO-ENTMCNC: 33.7 G/DL (ref 31.4–35)
MCV RBC AUTO: 86.3 FL (ref 79.6–97.8)
MONOCYTES # BLD: 0.7 K/UL (ref 0.1–1.3)
MONOCYTES NFR BLD: 7 % (ref 4–12)
NEUTS SEG # BLD: 5.9 K/UL (ref 1.7–8.2)
NEUTS SEG NFR BLD: 64 % (ref 43–78)
NRBC # BLD: 0 K/UL (ref 0–0.2)
PLATELET # BLD AUTO: 262 K/UL (ref 150–450)
PMV BLD AUTO: 9.8 FL (ref 9.4–12.3)
POTASSIUM SERPL-SCNC: 3.7 MMOL/L (ref 3.5–5.1)
PROT SERPL-MCNC: 8.2 G/DL (ref 6.3–8.2)
RBC # BLD AUTO: 4.75 M/UL (ref 4.05–5.2)
SODIUM SERPL-SCNC: 140 MMOL/L (ref 136–145)
WBC # BLD AUTO: 9.2 K/UL (ref 4.3–11.1)

## 2021-05-11 PROCEDURE — 93005 ELECTROCARDIOGRAM TRACING: CPT | Performed by: EMERGENCY MEDICINE

## 2021-05-11 PROCEDURE — 80053 COMPREHEN METABOLIC PANEL: CPT

## 2021-05-11 PROCEDURE — 70450 CT HEAD/BRAIN W/O DYE: CPT

## 2021-05-11 PROCEDURE — 85025 COMPLETE CBC W/AUTO DIFF WBC: CPT

## 2021-05-11 PROCEDURE — 99285 EMERGENCY DEPT VISIT HI MDM: CPT

## 2021-05-11 PROCEDURE — 74011250636 HC RX REV CODE- 250/636: Performed by: STUDENT IN AN ORGANIZED HEALTH CARE EDUCATION/TRAINING PROGRAM

## 2021-05-11 RX ORDER — PROMETHAZINE HYDROCHLORIDE 25 MG/1
25 TABLET ORAL
Qty: 12 TAB | Refills: 0 | Status: SHIPPED | OUTPATIENT
Start: 2021-05-11

## 2021-05-11 RX ORDER — METOCLOPRAMIDE HYDROCHLORIDE 5 MG/ML
10 INJECTION INTRAMUSCULAR; INTRAVENOUS
Status: COMPLETED | OUTPATIENT
Start: 2021-05-11 | End: 2021-05-11

## 2021-05-11 RX ORDER — DEXAMETHASONE SODIUM PHOSPHATE 100 MG/10ML
10 INJECTION INTRAMUSCULAR; INTRAVENOUS
Status: COMPLETED | OUTPATIENT
Start: 2021-05-11 | End: 2021-05-11

## 2021-05-11 RX ORDER — KETOROLAC TROMETHAMINE 30 MG/ML
30 INJECTION, SOLUTION INTRAMUSCULAR; INTRAVENOUS
Status: COMPLETED | OUTPATIENT
Start: 2021-05-11 | End: 2021-05-11

## 2021-05-11 RX ORDER — IBUPROFEN 800 MG/1
800 TABLET ORAL
Qty: 20 TAB | Refills: 0 | Status: SHIPPED | OUTPATIENT
Start: 2021-05-11 | End: 2021-05-18

## 2021-05-11 RX ORDER — DIPHENHYDRAMINE HYDROCHLORIDE 50 MG/ML
25 INJECTION, SOLUTION INTRAMUSCULAR; INTRAVENOUS
Status: COMPLETED | OUTPATIENT
Start: 2021-05-11 | End: 2021-05-11

## 2021-05-11 RX ADMIN — METOCLOPRAMIDE HYDROCHLORIDE 10 MG: 5 INJECTION INTRAMUSCULAR; INTRAVENOUS at 20:51

## 2021-05-11 RX ADMIN — KETOROLAC TROMETHAMINE 30 MG: 30 INJECTION, SOLUTION INTRAMUSCULAR at 20:53

## 2021-05-11 RX ADMIN — DEXAMETHASONE SODIUM PHOSPHATE 10 MG: 10 INJECTION, SOLUTION INTRAMUSCULAR; INTRAVENOUS at 20:56

## 2021-05-11 RX ADMIN — DIPHENHYDRAMINE HYDROCHLORIDE 25 MG: 50 INJECTION, SOLUTION INTRAMUSCULAR; INTRAVENOUS at 20:52

## 2021-05-11 RX ADMIN — SODIUM CHLORIDE 1000 ML: 900 INJECTION, SOLUTION INTRAVENOUS at 20:50

## 2021-05-11 NOTE — ED NOTES
Uncooperative with multiple attempts to perform NIH stroke scale. Drift noted to bilateral upper and lower extremities. With questioning  Pt reports \"im weak, I can hold them up\" however unable to do so despite numerous attempts.

## 2021-05-11 NOTE — ED TRIAGE NOTES
Arrives with face mask in place. 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 Reports frontal/temporal head pain, onset upon waking this afternoon to come to work. Attempted tylenol however head pain worsened. Reports dizziness, onset approx 10mins pta after arriving to work this PM. States attempted to stand \"and just fell to side\". Denies difficulty speaking. Hx HTN, reports compliance with meds. Associated nausea, blurred vision. Denies vomiting, chest pain, shortness of breath, numbness/tingling.

## 2021-05-12 ENCOUNTER — PATIENT OUTREACH (OUTPATIENT)
Dept: OTHER | Age: 31
End: 2021-05-12

## 2021-05-12 LAB
ATRIAL RATE: 105 BPM
CALCULATED P AXIS, ECG09: 78 DEGREES
CALCULATED R AXIS, ECG10: 54 DEGREES
CALCULATED T AXIS, ECG11: 48 DEGREES
DIAGNOSIS, 93000: NORMAL
P-R INTERVAL, ECG05: 162 MS
Q-T INTERVAL, ECG07: 370 MS
QRS DURATION, ECG06: 80 MS
QTC CALCULATION (BEZET), ECG08: 489 MS
VENTRICULAR RATE, ECG03: 105 BPM

## 2021-05-12 NOTE — ED NOTES
I have reviewed discharge instructions with the patient. The patient verbalized understanding. Patient left ED via Discharge Method: ambulatory to Home with mother. Opportunity for questions and clarification provided. Patient given 2 scripts. To continue your aftercare when you leave the hospital, you may receive an automated call from our care team to check in on how you are doing. This is a free service and part of our promise to provide the best care and service to meet your aftercare needs.  If you have questions, or wish to unsubscribe from this service please call [phone]. Thank you for Choosing our [facility] Emergency Department.

## 2021-05-12 NOTE — ED PROVIDER NOTES
34-year-old female patient with a history of hypertension and anxiety presents to the emergency department with reports of severe headache. Patient states she noted a slight headache upon waking approximately 3 PM today. She states this felt like a normal headache however upon arriving to work, this headache progressed significantly. She reports blurred vision and dizziness. She describes dizziness as lightheaded type sensation feeling as though she may faint. She denies syncope. She reports nausea without vomiting. She denies any associated fever, chills, neck pain or stiffness. Patient denies pain anywhere else in her body. She has been consistent taking her blood pressure medication. She denies history of tension or migraine headache in the past.           No past medical history on file. Past Surgical History:   Procedure Laterality Date    DELIVERY            No family history on file.     Social History     Socioeconomic History    Marital status: SINGLE     Spouse name: Not on file    Number of children: Not on file    Years of education: Not on file    Highest education level: Not on file   Occupational History    Not on file   Social Needs    Financial resource strain: Not on file    Food insecurity     Worry: Not on file     Inability: Not on file    Transportation needs     Medical: Not on file     Non-medical: Not on file   Tobacco Use    Smoking status: Never Smoker   Substance and Sexual Activity    Alcohol use: Yes     Comment: ocassionally    Drug use: Not on file    Sexual activity: Not on file   Lifestyle    Physical activity     Days per week: Not on file     Minutes per session: Not on file    Stress: Not on file   Relationships    Social connections     Talks on phone: Not on file     Gets together: Not on file     Attends Zoroastrianism service: Not on file     Active member of club or organization: Not on file     Attends meetings of clubs or organizations: Not on file     Relationship status: Not on file    Intimate partner violence     Fear of current or ex partner: Not on file     Emotionally abused: Not on file     Physically abused: Not on file     Forced sexual activity: Not on file   Other Topics Concern    Not on file   Social History Narrative    Not on file         ALLERGIES: Patient has no known allergies. Review of Systems   Constitutional: Negative for chills, diaphoresis and fever. HENT: Negative for congestion, sneezing and sore throat. Eyes: Positive for photophobia and visual disturbance. Respiratory: Negative for cough, chest tightness, shortness of breath and wheezing. Cardiovascular: Negative for chest pain and leg swelling. Gastrointestinal: Positive for nausea. Negative for abdominal pain, blood in stool, diarrhea and vomiting. Endocrine: Negative for polyuria. Genitourinary: Negative for difficulty urinating, dysuria, flank pain, hematuria and urgency. Musculoskeletal: Negative for back pain, myalgias, neck pain and neck stiffness. Skin: Negative for color change and rash. Neurological: Positive for light-headedness and headaches. Negative for dizziness, syncope, speech difficulty, weakness and numbness. Psychiatric/Behavioral: Negative for behavioral problems. All other systems reviewed and are negative. Vitals:    05/11/21 1942   BP: (!) 178/109   Pulse: 92   Resp: 20   Temp: 97.7 °F (36.5 °C)   SpO2: 100%   Weight: 97.1 kg (214 lb)   Height: 5' 3.25\" (1.607 m)            Physical Exam  Vitals signs and nursing note reviewed. Constitutional:       General: She is not in acute distress. Appearance: She is well-developed. She is not diaphoretic. Comments: Tearful appearing, alert and oriented to person place and time. No acute distress, speaks in clear, fluid sentences. HENT:      Head: Normocephalic and atraumatic.       Right Ear: Tympanic membrane, ear canal and external ear normal.      Left Ear: Tympanic membrane, ear canal and external ear normal.      Nose: Nose normal.   Eyes:      Pupils: Pupils are equal, round, and reactive to light. Neck:      Musculoskeletal: Full passive range of motion without pain and normal range of motion. Cardiovascular:      Rate and Rhythm: Normal rate and regular rhythm. Heart sounds: Normal heart sounds. No murmur. No friction rub. No gallop. Pulmonary:      Effort: Pulmonary effort is normal. No respiratory distress. Breath sounds: Normal breath sounds. No stridor. No decreased breath sounds, wheezing, rhonchi or rales. Chest:      Chest wall: No tenderness. Abdominal:      General: There is no distension. Palpations: Abdomen is soft. There is no mass. Tenderness: There is no abdominal tenderness. There is no guarding or rebound. Hernia: No hernia is present. Musculoskeletal: Normal range of motion. General: No tenderness or deformity. Skin:     General: Skin is warm and dry. Neurological:      Mental Status: She is alert and oriented to person, place, and time. Cranial Nerves: No cranial nerve deficit.           MDM       Procedures

## 2021-05-12 NOTE — PROGRESS NOTES
Patient on report as eligible for Case Management. Phone call placed to number in chart, no answer and no voicemail set up, unable to leave message. Will attempt to contact again to offer [address] [place] [address] Management services.

## 2021-05-12 NOTE — DISCHARGE INSTRUCTIONS
Take the medication prescribed as needed for headache and nausea. Drink plenty of clear liquids as discussed. You should rest in a quiet, dark room for the next several hours until your headache completely resolved. Arrange follow-up with your primary care provider this week and the neurology specialist listed if your headaches continue.

## 2021-05-13 ENCOUNTER — PATIENT OUTREACH (OUTPATIENT)
Dept: OTHER | Age: 31
End: 2021-05-13

## 2021-05-13 NOTE — PROGRESS NOTES
Patient identified as eligible for [address] [place] [address] services. Second telephone outreach attempted. Left discreet voicemail with this CM confidential contact information. Will send UTR letter.

## 2021-05-13 NOTE — LETTER
Ms. Josh Nazario 
[address] [place] [id number] Dear Ms. Gregor Noam My name is Jessica Luther LPN, Associate Care Manager for [facility] and I have been trying to reach you. The Associate Care Management (ACM) program is a free-of-charge confidential service provided to our associates and their family members covered by the [facility]. The program will provide an associate and his/her family with the [address] [place] [address] expertise to assist in navigating the health care delivery system, provider services, and their overall care needsso as to assure and improve health care interactions and enhance the quality of life. This program is designed to provide you with the opportunity to have a [facility] partner with you for the following services: 
 
 1) when you come home from the hospital or emergency room 2) when help is needed to manage your disease 3) when you need assistance coordinating services or appointments 4) when you need additional education, resources or assistance reaching your Be Well Health Program goals/requirements such as Be Well With Diabetes [address] [place] [address] is dedicated to empowering the good health of its community and improving the quality of care and care experiences for associates and their families. We are committed to safeguarding patient confidentiality and privacy, assuring that every associate has the respect he or she deserves in managing their health. The information shared with your care manager will not be shared with anyone else aside from those you identify as part of your care team, and will only be used to assist you with any identified care needs. Please contact me if you would like this service provided to you. Sincerely, Jessica Luther LPN  [facility] Care Coordinator [address] [place] [address] [address] Damon Ramos C [phone] F [phone] [email or web address]

## 2021-06-11 ENCOUNTER — PATIENT OUTREACH (OUTPATIENT)
Dept: OTHER | Age: 31
End: 2021-06-11

## 2021-06-11 NOTE — PROGRESS NOTES
Resolving current episode for case management due to patient unable to reach. Patient has not been reached after several calls and UTR letter. Letter sent to patient notifying completion of services due to unable to reach. This writer's contact information and information regarding program services included in materials sent.

## 2023-01-01 NOTE — PROGRESS NOTES
Chest Tube Removal Procedure Note    The patient was given 0.2 mg of IV morphine immediately prior to procedure. The dressing was removed. Using sterile technique the chest tube and pacer wire sites were cleaned with chloroprep. Sterile steristrips placed over pacer wires. The suture was then removed and the chest tube discontinued, intact, and without incident. A Vaseline gauze, sterile gauze, and tegaderm were placed on the site. Post removal chest xray did not show a pneumothorax. The patient tolerated the procedure well.       Daina Pichardo DNP, CPNP-AC  Pediatric Cardiac Critical Care     Kenzie Farmer  : 1990  Primary: [facility]  Secondary:  [address]  at [facility]  [address] [facility] [address], [facility] [address].  [phone]   [phone]          OUTPATIENT PHYSICAL THERAPY:Daily Note 2018   ICD-10: Treatment Diagnosis:   · Pain in left knee (M25.562)  · Stiffness of left knee, not elsewhere classified (H61.830)  Precautions/Allergies:   Review of patient's allergies indicates no known allergies. Fall Risk Score: 6 (? 5 = High Risk)  MD Orders: Evaluate and Treat MEDICAL/REFERRING DIAGNOSIS:  Other chronic pain [G89.29]  Pain in left knee [M25.562]   DATE OF ONSET: Chronic  REFERRING PHYSICIAN: Taniya Bucio*  RETURN PHYSICIAN APPOINTMENT: TBD     ASSESSMENT:  Ms. Laina Cuadra presents with decreased mobility and pain secondary to degenerative changes. Patient has attended a total of 2 scheduled physical therapy visits including initial evaluation on 2018. Treatment has consisted of strengthening, stretching, and modalities to improve pain and performance with activities of daily living. PROBLEM LIST (Impacting functional limitations):  1. Decreased Strength  2. Decreased ADL/Functional Activities  3. Increased Pain  4. Decreased Activity Tolerance INTERVENTIONS PLANNED:  1. Balance Exercise  2. Home Exercise Program (HEP)  3. Neuromuscular Re-education/Strengthening  4. Range of Motion (ROM)  5. Therapeutic Activites  6. Therapeutic Exercise/Strengthening   TREATMENT PLAN:  Effective Dates: 2018 TO 2018 (90 days). Frequency/Duration: 2 times a week for 90 Days  GOALS: (Goals have been discussed and agreed upon with patient.)  Short-Term Functional Goals: Time Frame: 30 days  1. Patient will be independent with home exercise program without exacerbation of symptoms or cueing needed.    2. Patient will be independent with correct left LE positioning and awareness/avoidance of aggravating positions without cueing needed. Discharge Goals: Time Frame: 90 days  1. Patient will be independent with all ADLs with minimal onset of left knee pain and no deficits with daily tasks. 2. Patient will report no fear avoidance with social or recreational activities due to left knee pain. 3. Patient will score greater than or equal to 70/80 on Lower Extremity Functional Scale with minimal effect of left knee pain on patient's ability to manage every day life activities. Rehabilitation Potential For Stated Goals: Good            The information in this section was collected on 2018 (except where otherwise noted). HISTORY:   History of Present Injury/Illness (Reason for Referral):  Patient reports for several months she has been having left knee pain. She reports that it \"pops out and is painful\" until she is able to \"pop it back into place. \"  She reports that she has fallen several times because of the pain. She reports that she works nights now at [employer] and so does more sitting, but she is still having pain. She reports she would like to improve her overall range of motion and decrease her pain with all activities so she doesn't fall again. Past Medical History/Comorbidities:   Ms. Humberto Wang  has no past medical history of Aneurysm ([address] Arrhythmia; Arthritis; Asthma; Autoimmune disease ([address] CAD (coronary artery disease); Cancer ([address] Chronic kidney disease; Chronic pain; Coagulation defects; COPD; Diabetes ([address] GERD (gastroesophageal reflux disease); Heart failure ([address] Hypertension; Liver disease; Psychiatric disorder; PUD (peptic ulcer disease); Seizures ([address] Stroke [facility]); Thromboembolus ([address] or Thyroid disease. Ms. Humberto Wang  has a past surgical history that includes delivery .   Social History/Living Environment:   Home Environment: Private residence  Living Alone: No  Support Systems: Family member(s), Friends \ neighbors  Prior Level of Function/Work/Activity:  Independent  Dominant Side:         RIGHT  Personal Factors:          Sex:  female        Age:  33 y.o. Current Medications:     None   Date Last Reviewed:  8/29/2018    Number of Personal Factors/Comorbidities that affect the Plan of Care: 0: LOW COMPLEXITY   EXAMINATION:   Observation/Orthostatic Postural Assessment:          Posture Assessment: Rounded shoulders, Forward head   Palpation:          Tightness and tenderness to palpation noted along lateral portion of left knee. Decreased patellar mobility. No bruising, minimal swelling noted. ROM:          L LE Assessment (AROM):   · Knee Flexion: 120 degrees    · Knee Extension: 0 degrees     Strength:          L LE Assessment (Strength):   · Knee Flexion: 3+/5 with manual muscle testing    · Knee Extension: 3+/5 with manual muscle testing     Special Tests:          Negative anterior drawer test for ACL involvement. Negative (weak) Medial-Lateral Grind test for possible meniscal involvement in left lower extremity. Negative anurag test with rotation for ACL, MCL or capsular involvement. Negative Posterior Drawer test for PCL involvement. Negative posteromedial drawer test for PCL, MCL or capsular involvement. Negative posterolateral drawer test for PCL, LCL or capsular involvement. Neurological Screen:        Dermatomes: Within normal limits        Reflexes:  2+  Functional Mobility:         Gait/Ambulation:     · Base of Support: Narrowed  · Speed/Charlene: Pace decreased (<100 feet/min)  · Step Length: Left shortened, Right lengthened  · Swing Pattern: Left asymmetrical, Right symmetrical  · Stance: Left decreased, Right increased  · Gait Abnormalities: Antalgic  · Ambulation - Level of Assistance: Independent  · Interventions: Verbal cues, Safety awareness training          Transfers:  Independent        Bed Mobility:  Independent   Body Structures Involved:  1. Joints  2.  Muscles Body Functions Affected:  1. Neuromusculoskeletal  2. Movement Related Activities and Participation Affected:  1. Mobility  2. Self Care   Number of elements (examined above) that affect the Plan of Care: 4+: HIGH COMPLEXITY   CLINICAL PRESENTATION:   Presentation: Stable and uncomplicated: LOW COMPLEXITY   CLINICAL DECISION MAKING:   Outcome Measure: Tool Used: Lower Extremity Functional Scale (LEFS)  Score:  Initial: 59/80 Most Recent: X/80 (Date: -- )   Interpretation of Score: 20 questions each scored on a 5 point scale with 0 representing \"extreme difficulty or unable to perform\" and 4 representing \"no difficulty\". The lower the score, the greater the functional disability. 80/80 represents no disability. Minimal detectable change is 9 points. Score 80 79-63 62-48 47-32 31-16 15-1 0   Modifier CH CI CJ CK CL CM CN     Medical Necessity:   · Patient is expected to demonstrate progress in strength and range of motion to increase independence with daily activities. · Patient demonstrates good rehab potential due to higher previous functional level. · Skilled intervention continues to be required due to decreased mobility. Reason for Services/Other Comments:  · Patient continues to demonstrate capacity to improve overall functional mobility which will increase independence and increase safety. Use of outcome tool(s) and clinical judgement create a POC that gives a: Clear prediction of patient's progress: LOW COMPLEXITY            TREATMENT:   (In addition to Assessment/Re-Assessment sessions the following treatments were rendered)  Pre-treatment Symptoms/Complaints:  8/29/2018: Patient reports her knee has \"popped in and out\" twice since she was last in therapy.    Pain: Initial:   Pain Intensity 1: 5  Pain Location 1: Knee  Pain Orientation 1: Left  Pain Intervention(s) 1: Rest, Medication (see MAR)  Post Session:  5/10     THERAPEUTIC EXERCISE: (35 minutes):  Exercises per grid below to improve mobility, strength, balance and coordination. Required minimal visual, verbal and manual cues to promote proper body alignment, promote proper body posture, promote proper body mechanics and promote proper body breathing techniques. Progressed resistance, range, repetitions and complexity of movement as indicated. Date:  8/29/2018   Activity/Exercise Parameters   Nu-step 5 minutes  Level 2   Tiltboard: right/left, anterior/posterior Static: 20 seconds  Dynamic: 20 reps   Step ups 6 inch  10 reps  B LE   Calf stretch on slantboard 5 second hold  10 reps   SAQ 15 reps  B LE   Supine straight leg raise: hip flexion 10 reps  L LE   Side-lying hip abduction 10 reps  L LE   Kinesiotape L knee - for stability      MODALITIES: (8 minutes): *  Ultrasound was used today secondary to the patient having tightened structures limiting joint motion that require an increase in extensibility and symptomatic soft tissue calcification. Ultrasound was used today to reduce pain, reduce joint stiffness, increase muscle flexibility, increase tendon flexibility and increase ligament flexibility. (Patient in supine - left knee - skin clear after treatment.)   [facility] Portal    Treatment/Session Assessment:    · Response to Treatment:  Patient tolerated treatment without complaints of increased pain. Difficulty with step ups and tiltboard. Instructed patient to take tape off if pain increases or skin becomes irritated. · Compliance with Program/Exercises: Will assess as treatment progresses. · Recommendations/Intent for next treatment session: \"Next visit will focus on advancements to more challenging activities\".   Total Treatment Duration:  PT Patient Time In/Time Out  Time In: 0730  Time Out: [id number], PT